# Patient Record
Sex: FEMALE | Race: WHITE | NOT HISPANIC OR LATINO | Employment: FULL TIME | URBAN - METROPOLITAN AREA
[De-identification: names, ages, dates, MRNs, and addresses within clinical notes are randomized per-mention and may not be internally consistent; named-entity substitution may affect disease eponyms.]

---
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
## 2023-06-19 PROBLEM — N94.6 DYSMENORRHEA: Status: ACTIVE | Noted: 2023-06-19

## 2023-06-19 PROBLEM — N93.9 ABNORMAL UTERINE BLEEDING: Status: ACTIVE | Noted: 2023-06-19

## 2023-07-07 PROBLEM — F51.05 HYPOSOMNIA, INSOMNIA OR SLEEPLESSNESS ASSOCIATED WITH ANXIETY: Status: ACTIVE | Noted: 2023-07-07

## 2023-07-07 PROBLEM — F41.9 HYPOSOMNIA, INSOMNIA OR SLEEPLESSNESS ASSOCIATED WITH ANXIETY: Status: ACTIVE | Noted: 2023-07-07

## 2023-07-18 PROBLEM — N83.201 RIGHT OVARIAN CYST: Status: ACTIVE | Noted: 2023-07-18

## 2023-08-31 PROBLEM — G89.29 CHRONIC PELVIC PAIN IN FEMALE: Status: ACTIVE | Noted: 2023-08-31

## 2023-08-31 PROBLEM — R10.2 CHRONIC PELVIC PAIN IN FEMALE: Status: ACTIVE | Noted: 2023-08-31

## 2023-08-31 PROBLEM — N83.201 RIGHT OVARIAN CYST: Status: RESOLVED | Noted: 2023-07-18 | Resolved: 2023-08-31

## 2024-03-20 PROBLEM — R10.2 PELVIC PAIN: Status: ACTIVE | Noted: 2024-03-20

## 2024-05-15 ENCOUNTER — TELEPHONE (OUTPATIENT)
Dept: CARDIOLOGY | Facility: MEDICAL CENTER | Age: 19
End: 2024-05-15

## 2024-05-15 NOTE — TELEPHONE ENCOUNTER
DALI EOS to [facility] for review on 5/16/24 as ADD  Monitor noted:  Sinus rhythm,  with an avg rate of 79 BPM  22 patient events, sinus  BPM